# Patient Record
Sex: FEMALE | NOT HISPANIC OR LATINO | URBAN - METROPOLITAN AREA
[De-identification: names, ages, dates, MRNs, and addresses within clinical notes are randomized per-mention and may not be internally consistent; named-entity substitution may affect disease eponyms.]

---

## 2017-05-11 ENCOUNTER — IMPORTED ENCOUNTER (OUTPATIENT)
Dept: URBAN - METROPOLITAN AREA CLINIC 1 | Facility: CLINIC | Age: 72
End: 2017-05-11

## 2017-05-11 PROBLEM — H40.013: Noted: 2017-05-11

## 2017-05-11 PROBLEM — H25.813: Noted: 2017-05-11

## 2017-05-11 PROBLEM — H35.371: Noted: 2017-05-11

## 2017-05-11 PROBLEM — H43.392: Noted: 2017-05-11

## 2017-05-11 PROBLEM — H04.123: Noted: 2017-05-11

## 2017-05-11 PROBLEM — H16.143: Noted: 2017-05-11

## 2017-05-11 PROCEDURE — 92250 FUNDUS PHOTOGRAPHY W/I&R: CPT

## 2017-05-11 PROCEDURE — 92004 COMPRE OPH EXAM NEW PT 1/>: CPT

## 2017-05-11 PROCEDURE — 92015 DETERMINE REFRACTIVE STATE: CPT

## 2017-05-11 NOTE — PATIENT DISCUSSION
1.  KERRY w/ PEK OU- Recommend ATs QD-BID OU PRN 2. Cataract OU: Observe for now without intervention. The patient was advised to contact us if any change or worsening of vision3. Glaucoma Suspect OU (CD 0.60/0.65): IOP 17 OU. Negative family hx. Patient is considered Low Risk. Condition was discussed with patient and patient understands. Will continue to monitor patient for any progression in condition. Patient was advised to call us with any problems questions or concerns. Baseline disc photos done today. 4.  Epiretinal Membrane OD - Observe for change. 5. Floater OS6. S/p Blepharoplasty UL OU MRX for glasses givenReturn for an appointment in 1 month 10/OCT/24-2 VF with Dr. Jodie Barbour.

## 2017-06-07 ENCOUNTER — IMPORTED ENCOUNTER (OUTPATIENT)
Dept: URBAN - METROPOLITAN AREA CLINIC 1 | Facility: CLINIC | Age: 72
End: 2017-06-07

## 2017-06-07 PROBLEM — H25.813: Noted: 2017-06-07

## 2017-06-07 PROBLEM — H04.123: Noted: 2017-06-07

## 2017-06-07 PROBLEM — H40.013: Noted: 2017-06-07

## 2017-06-07 PROBLEM — H16.143: Noted: 2017-06-07

## 2017-06-07 PROCEDURE — 92083 EXTENDED VISUAL FIELD XM: CPT

## 2017-06-07 PROCEDURE — 92133 CPTRZD OPH DX IMG PST SGM ON: CPT

## 2017-06-07 PROCEDURE — 92012 INTRM OPH EXAM EST PATIENT: CPT

## 2017-06-07 NOTE — PATIENT DISCUSSION
1.  Glaucoma Suspect OU (CD 0.60/0.65): IOP stable. OCT and HVF WNL OU today. Negative family hx. Patient is considered Low Risk. Condition was discussed with patient and patient understands. Will continue to monitor patient for any progression in condition. Patient was advised to call us with any problems questions or concerns. 2.  KERRY w/ PEK OU- Recommend PF ATs BID OU routinely (sample of PF Systane Ultra given) 3. Cataract OU: Observe for now without intervention. The patient was advised to contact us if any change or worsening of vision4. H/o Epiretinal Membrane OD - Observe for change. 5. H/o Floater OS6. S/p Blepharoplasty UL OUReturn for an appointment in 1 year 30/OCT with Dr. Nanci Healy.

## 2018-06-06 ENCOUNTER — IMPORTED ENCOUNTER (OUTPATIENT)
Dept: URBAN - METROPOLITAN AREA CLINIC 1 | Facility: CLINIC | Age: 73
End: 2018-06-06

## 2018-06-06 PROBLEM — H43.812: Noted: 2018-06-06

## 2018-06-06 PROBLEM — H35.371: Noted: 2018-06-06

## 2018-06-06 PROBLEM — H25.813: Noted: 2018-06-06

## 2018-06-06 PROBLEM — H40.013: Noted: 2018-06-06

## 2018-06-06 PROBLEM — H16.143: Noted: 2018-06-06

## 2018-06-06 PROBLEM — H04.123: Noted: 2018-06-06

## 2018-06-06 PROCEDURE — 92014 COMPRE OPH EXAM EST PT 1/>: CPT

## 2018-06-06 PROCEDURE — 92133 CPTRZD OPH DX IMG PST SGM ON: CPT

## 2018-06-06 PROCEDURE — 92015 DETERMINE REFRACTIVE STATE: CPT

## 2018-06-06 NOTE — PATIENT DISCUSSION
1.  Glaucoma Suspect OU (0.65 OU):Stable IOP OU. Normal OCT OU. Patient is considered Low Risk. Condition was discussed with patient and patient understands. Will continue to monitor patient for any progression in condition. Patient was advised to call us with any problems questions or concerns. 2.  Cataract OU: Observe for now without intervention. The patient was advised to contact us if any change or worsening of vision3. KERRY w/ PEK OU- No improvement. The use of artificial tears OU BID were recommended. Sample of Refresh given. 4. Epiretinal Membrane OD- Appears benign. Stable. Observe for change. 5. PVD w/o Tear OS- Old stable. 6. Return for an appointment for a 27 in 1 year with Dr. Favio Rasmussen.

## 2019-06-03 ENCOUNTER — IMPORTED ENCOUNTER (OUTPATIENT)
Dept: URBAN - METROPOLITAN AREA CLINIC 1 | Facility: CLINIC | Age: 74
End: 2019-06-03

## 2019-06-03 PROBLEM — H43.812: Noted: 2019-06-03

## 2019-06-03 PROBLEM — H25.813: Noted: 2019-06-03

## 2019-06-03 PROBLEM — H04.123: Noted: 2019-06-03

## 2019-06-03 PROBLEM — H16.143: Noted: 2019-06-03

## 2019-06-03 PROBLEM — H40.013: Noted: 2019-06-03

## 2019-06-03 PROBLEM — H35.371: Noted: 2019-06-03

## 2019-06-03 PROCEDURE — 92014 COMPRE OPH EXAM EST PT 1/>: CPT

## 2019-06-03 PROCEDURE — 92015 DETERMINE REFRACTIVE STATE: CPT

## 2019-06-03 NOTE — PATIENT DISCUSSION
1.  Cataracts OU -- Observe for now without intervention. The patient was advised to contact us if any change or worsening of vision. 2. KERRY w/ PEK OU -- Recommend continue the frequent use of OTC AT's BID-QID OU Routinely. 3.  Epiretinal Membrane OD -- Stable appears benign. Continue to observe for changes / progression. 4. Glaucoma Suspect OU (CD: 0.70 / 0.75) -- IOP 17 OU today. Negative Family h/o Glaucoma. Past w/u Negative & WNL OU. Patient is considered Low Risk. Condition was discussed with patient and patient understands. Will continue to monitor patient for any progression in condition. Patient was advised to call us with any problems questions or concerns. 5.  PVD w/o Tear OS -- Old Stable. RD precautions given. 6.  S/p Blepharoplasty UL OU Finalized Glasses MRx was given to patient today. Return for an appointment in 1 YR for a 27 / OCT OU with Dr. Nayely Quiñonez.

## 2020-06-01 ENCOUNTER — IMPORTED ENCOUNTER (OUTPATIENT)
Dept: URBAN - METROPOLITAN AREA CLINIC 1 | Facility: CLINIC | Age: 75
End: 2020-06-01

## 2020-06-01 PROBLEM — H25.813: Noted: 2020-06-01

## 2020-06-01 PROBLEM — H35.371: Noted: 2020-06-01

## 2020-06-01 PROBLEM — H04.123: Noted: 2020-06-01

## 2020-06-01 PROBLEM — H16.143: Noted: 2020-06-01

## 2020-06-01 PROBLEM — H43.812: Noted: 2020-06-01

## 2020-06-01 PROBLEM — H40.013: Noted: 2020-06-01

## 2020-06-01 PROCEDURE — 92014 COMPRE OPH EXAM EST PT 1/>: CPT

## 2020-06-01 PROCEDURE — 92015 DETERMINE REFRACTIVE STATE: CPT

## 2020-06-01 PROCEDURE — 92133 CPTRZD OPH DX IMG PST SGM ON: CPT

## 2020-06-01 NOTE — PATIENT DISCUSSION
1.  Glaucoma Suspect OU : (0.7/0.75) OCT normal. Condition was discussed with patient and patient understands. Will continue to monitor patient for any progression in condition. Patient was advised to call us with any problems questions or concerns. 2.  Cataract OU: Observe for now without intervention. The patient was advised to contact us if any change or worsening of vision3. KERRY w/ PEK OU-The use/continuation of artificial tears were recommended. 4.  PVD w/o Tear OS- RD precautions. 5.  Epiretinal Membrane OD - Observe for change. 6. Return for an appointment in 1 year for 30 with Dr. Michelle Garza  (No testing)

## 2021-02-26 ENCOUNTER — IMPORTED ENCOUNTER (OUTPATIENT)
Dept: URBAN - METROPOLITAN AREA CLINIC 1 | Facility: CLINIC | Age: 76
End: 2021-02-26

## 2021-02-26 PROBLEM — H43.811: Noted: 2021-02-26

## 2021-02-26 PROCEDURE — 99213 OFFICE O/P EST LOW 20 MIN: CPT

## 2021-02-26 NOTE — PATIENT DISCUSSION
1.  PVD w/o Tear OU -- *New OD* RD Precautions. 2.  Cataract OU -- Observe. 3. KERRY w/ PEK OU -- Cont the use of ATs BID OU routinely. 4.  Glaucoma Suspect OU -- (C/D: 0.70/0.75) (-) Family Hx. Past w/i Negative. Patient is considered Low Risk. 5.  Epiretinal Membrane OD -- Observe for change. Return for an appointment as scheduled (30) with Dr. Bear Lockett.

## 2021-03-08 ENCOUNTER — IMPORTED ENCOUNTER (OUTPATIENT)
Dept: URBAN - METROPOLITAN AREA CLINIC 1 | Facility: CLINIC | Age: 76
End: 2021-03-08

## 2021-03-08 PROBLEM — H00.11: Noted: 2021-03-08

## 2021-03-08 PROBLEM — H00.12: Noted: 2021-03-08

## 2021-03-08 PROBLEM — H01.001: Noted: 2021-03-08

## 2021-03-08 PROCEDURE — 92012 INTRM OPH EXAM EST PATIENT: CPT

## 2021-03-08 NOTE — PATIENT DISCUSSION
1.  Chalazion right upper eyelid/right lower lid - Hot compresses TID x 5 minutes for 3 weeks. Begin Tobradex gtts QID OD. If without improvement discussed with patient possible Incision and Drainage procedure. Risks and benefits discussed with patient and patient states full understanding. 2. Anterior Blepharitis OD - Daily Hot compresses and lid scrubs were recommended. 3. Return for an appointment in 1 week for 10 with Dr. Gege Beauchamp.

## 2021-03-15 ENCOUNTER — IMPORTED ENCOUNTER (OUTPATIENT)
Dept: URBAN - METROPOLITAN AREA CLINIC 1 | Facility: CLINIC | Age: 76
End: 2021-03-15

## 2021-03-15 PROBLEM — H04.123: Noted: 2021-03-15

## 2021-03-15 PROBLEM — H25.13: Noted: 2021-03-15

## 2021-03-15 PROBLEM — H00.12: Noted: 2021-03-15

## 2021-03-15 PROBLEM — H00.11: Noted: 2021-03-15

## 2021-03-15 PROCEDURE — 92012 INTRM OPH EXAM EST PATIENT: CPT

## 2021-03-15 NOTE — PATIENT DISCUSSION
1.  Chalazion right upper and right lower eyelid - Hot compresses TID x 5 minutes for 3 weeks. If without improvement discussed with patient possible Incision and Drainage procedure. Risks and benefits discussed with patient and patient states full understanding. Cont Tobradex tid x 1 wk  then D/C2. Cataract OU: Observe for now without intervention. The patient was advised to contact us if any change or worsening of vision3. Dry Eyes OU -The use/continuation of artificial tears were recommended. Use OD after 1 wk. Cont OS4. Return for an appointment in 2 weeks for 10 with Dr. Jessica Hess.

## 2021-06-01 ENCOUNTER — IMPORTED ENCOUNTER (OUTPATIENT)
Dept: URBAN - METROPOLITAN AREA CLINIC 1 | Facility: CLINIC | Age: 76
End: 2021-06-01

## 2021-06-01 PROBLEM — H25.813: Noted: 2021-06-01

## 2021-06-01 PROBLEM — H16.143: Noted: 2021-06-01

## 2021-06-01 PROBLEM — H04.123: Noted: 2021-06-01

## 2021-06-01 PROBLEM — H43.813: Noted: 2021-06-01

## 2021-06-01 PROBLEM — H40.013: Noted: 2021-06-01

## 2021-06-01 PROBLEM — H35.371: Noted: 2021-06-01

## 2021-06-01 PROCEDURE — 99214 OFFICE O/P EST MOD 30 MIN: CPT

## 2021-06-01 NOTE — PATIENT DISCUSSION
1.  Glaucoma Suspect OU -(C/D: 0.70/0.75) (-) Family Hx. Past w/i Negative. Patient is considered Low Risk. Condition was discussed with patient and patient understands. Will continue to monitor patient for any progression in condition. Patient was advised to call us with any problems questions or concerns. 2.  Cataract OU - Observe for now without intervention. The patient was advised to contact us if any change or worsening of vision3. KERRY w/ PEK OU-The use/continuation of artificial tears were recommended. 4.  PVD w/o Tear OU - RD precautions. 5.  Epiretinal Membrane OD - Observe for change. Patient deferred Manifest Rx today. Return for an appointment in 1 year 30/OCT with Dr. eYimy Sanches.

## 2022-01-26 ENCOUNTER — IMPORTED ENCOUNTER (OUTPATIENT)
Dept: URBAN - METROPOLITAN AREA CLINIC 1 | Facility: CLINIC | Age: 77
End: 2022-01-26

## 2022-01-26 PROBLEM — H35.371: Noted: 2022-01-26

## 2022-01-26 PROBLEM — H35.373: Noted: 2022-01-26

## 2022-01-26 PROCEDURE — 92134 CPTRZ OPH DX IMG PST SGM RTA: CPT

## 2022-01-26 PROCEDURE — 92015 DETERMINE REFRACTIVE STATE: CPT

## 2022-01-26 PROCEDURE — 99213 OFFICE O/P EST LOW 20 MIN: CPT

## 2022-01-26 NOTE — PATIENT DISCUSSION
1.  Epiretinal Membrane OD w/ slight foveal involvment - Mac OCT shows ERM OD WNL OS. Observe for change. 2. Cataract OU - Observe for now without intervention. The patient was advised to contact us if any change or worsening of vision3. Glaucoma Suspect OU -(C/D: 0.70/0.75) (-) Family Hx. Past w/i Negative. Patient is considered Low Risk. Condition was discussed with patient and patient understands. Will continue to monitor patient for any progression in condition. Patient was advised to call us with any problems questions or concerns. 2.  Cataract OU - Observe for now without intervention. The patient was advised to contact us if any change or worsening of vision4. KERRY w/ PEK OU-The use/continuation of artificial tears were recommended. 5.  PVD w/o Tear OU - RD precautions. MRX for glasses given. (set expectations with OD due to ERM)Return for an appointment for Return as scheduled with Dr. Joni Schaumann.

## 2022-04-02 ASSESSMENT — VISUAL ACUITY
OS_SC: 20/20-2
OS_SC: 20/25
OD_GLARE: 20/50
OS_GLARE: 20/30
OS_SC: 20/20
OD_GLARE: 20/60
OS_CC: J1+
OD_CC: J1+
OD_CC: J1
OD_CC: 20/30-2
OD_SC: 20/25-2
OD_SC: 20/20
OD_CC: J1+
OD_SC: 20/20
OS_SC: 20/20
OS_CC: J1
OD_GLARE: 20/50
OS_GLARE: 20/60
OS_SC: 20/20
OS_GLARE: 20/60
OS_CC: J1+
OD_GLARE: 20/60
OS_GLARE: 20/50
OS_SC: 20/20
OD_SC: 20/25
OD_SC: 20/20
OS_CC: 20/30
OD_GLARE: 20/50
OS_GLARE: 20/50
OD_SC: 20/20
OD_SC: 20/20
OD_SC: 20/20-2
OS_SC: 20/20-2
OD_SC: 20/20

## 2022-04-02 ASSESSMENT — KERATOMETRY
OS_AXISANGLE2_DEGREES: 076
OS_K2POWER_DIOPTERS: 46.00
OS_AXISANGLE_DEGREES: 166
OS_K1POWER_DIOPTERS: 45.50
OD_AXISANGLE_DEGREES: 161
OD_K2POWER_DIOPTERS: 45.75
OD_K1POWER_DIOPTERS: 45.00
OD_AXISANGLE2_DEGREES: 071

## 2022-04-02 ASSESSMENT — TONOMETRY
OS_IOP_MMHG: 18
OD_IOP_MMHG: 16
OS_IOP_MMHG: 17
OS_IOP_MMHG: 17
OS_IOP_MMHG: 18
OS_IOP_MMHG: 15
OD_IOP_MMHG: 17
OD_IOP_MMHG: 17
OD_IOP_MMHG: 16
OD_IOP_MMHG: 16
OD_IOP_MMHG: 17
OD_IOP_MMHG: 15
OS_IOP_MMHG: 15
OS_IOP_MMHG: 17
OD_IOP_MMHG: 16
OS_IOP_MMHG: 16
OD_IOP_MMHG: 18
OD_IOP_MMHG: 15
OS_IOP_MMHG: 19

## 2022-06-10 ENCOUNTER — ESTABLISHED PATIENT (OUTPATIENT)
Dept: URBAN - METROPOLITAN AREA CLINIC 1 | Facility: CLINIC | Age: 77
End: 2022-06-10

## 2022-06-10 DIAGNOSIS — H43.813: ICD-10-CM

## 2022-06-10 DIAGNOSIS — H04.123: ICD-10-CM

## 2022-06-10 DIAGNOSIS — H25.813: ICD-10-CM

## 2022-06-10 DIAGNOSIS — H16.143: ICD-10-CM

## 2022-06-10 DIAGNOSIS — H35.371: ICD-10-CM

## 2022-06-10 DIAGNOSIS — H40.013: ICD-10-CM

## 2022-06-10 PROCEDURE — 99214 OFFICE O/P EST MOD 30 MIN: CPT

## 2022-06-10 PROCEDURE — 92133 CPTRZD OPH DX IMG PST SGM ON: CPT

## 2022-06-10 ASSESSMENT — VISUAL ACUITY
OD_CC: 20/20
OD_CC: J1+
OS_CC: J1+
OS_CC: 20/20-

## 2022-06-10 ASSESSMENT — TONOMETRY
OS_IOP_MMHG: 15
OD_IOP_MMHG: 15

## 2022-06-10 NOTE — PATIENT DISCUSSION
(C/D: 0.70/0.70) - IOP stable 15 OU. (-) Family Hx. OCT remains WNL OU. Condition was discussed with patient and patient understands. Will continue to monitor patient for any progression in condition. Patient was advised to call us with any problems questions or concerns.

## 2022-12-13 ENCOUNTER — FOLLOW UP (OUTPATIENT)
Dept: URBAN - METROPOLITAN AREA CLINIC 1 | Facility: CLINIC | Age: 77
End: 2022-12-13

## 2022-12-13 PROCEDURE — 92015 DETERMINE REFRACTIVE STATE: CPT

## 2022-12-13 PROCEDURE — 99213 OFFICE O/P EST LOW 20 MIN: CPT

## 2022-12-13 ASSESSMENT — VISUAL ACUITY
OD_CC: J1
OD_CC: 20/20
OS_CC: J1
OS_CC: 20/20

## 2022-12-13 ASSESSMENT — TONOMETRY
OD_IOP_MMHG: 18
OS_IOP_MMHG: 18

## 2022-12-13 NOTE — PATIENT DISCUSSION
(C/D: 0.70/0.70) - IOP stable at 18 OU today. (-) Family Hx.  Patient is considered low risk. Condition was discussed with patient and patient understands. Will continue to monitor patient for any progression in condition. Patient was advised to call us with any problems, questions, or concerns.

## 2023-06-16 ENCOUNTER — COMPREHENSIVE EXAM (OUTPATIENT)
Dept: URBAN - METROPOLITAN AREA CLINIC 1 | Facility: CLINIC | Age: 78
End: 2023-06-16

## 2023-06-16 DIAGNOSIS — H40.013: ICD-10-CM

## 2023-06-16 DIAGNOSIS — H16.143: ICD-10-CM

## 2023-06-16 DIAGNOSIS — H25.813: ICD-10-CM

## 2023-06-16 DIAGNOSIS — H35.371: ICD-10-CM

## 2023-06-16 DIAGNOSIS — H04.123: ICD-10-CM

## 2023-06-16 DIAGNOSIS — H43.813: ICD-10-CM

## 2023-06-16 PROCEDURE — 99214 OFFICE O/P EST MOD 30 MIN: CPT

## 2023-06-16 PROCEDURE — 92133 CPTRZD OPH DX IMG PST SGM ON: CPT

## 2023-06-16 ASSESSMENT — VISUAL ACUITY
OD_CC: 20/20
OS_CC: J1+
OS_CC: 20/20
OD_CC: J1+

## 2023-06-16 ASSESSMENT — TONOMETRY
OD_IOP_MMHG: 16
OS_IOP_MMHG: 17

## 2023-12-12 ENCOUNTER — FOLLOW UP (OUTPATIENT)
Dept: URBAN - METROPOLITAN AREA CLINIC 1 | Facility: CLINIC | Age: 78
End: 2023-12-12

## 2023-12-12 DIAGNOSIS — H25.813: ICD-10-CM

## 2023-12-12 DIAGNOSIS — H35.373: ICD-10-CM

## 2023-12-12 DIAGNOSIS — H35.3131: ICD-10-CM

## 2023-12-12 PROCEDURE — 92134 CPTRZ OPH DX IMG PST SGM RTA: CPT

## 2023-12-12 PROCEDURE — 99213 OFFICE O/P EST LOW 20 MIN: CPT

## 2023-12-12 ASSESSMENT — VISUAL ACUITY
OD_BAT: 20/30
OD_CC: J1+
OS_CC: J1+
OS_BAT: 20/40
OD_CC: 20/20
OS_CC: 20/25

## 2023-12-12 ASSESSMENT — TONOMETRY
OS_IOP_MMHG: 17
OD_IOP_MMHG: 17

## 2024-08-05 ENCOUNTER — COMPREHENSIVE EXAM (OUTPATIENT)
Dept: URBAN - METROPOLITAN AREA CLINIC 1 | Facility: CLINIC | Age: 79
End: 2024-08-05

## 2024-08-05 DIAGNOSIS — H35.373: ICD-10-CM

## 2024-08-05 DIAGNOSIS — H04.123: ICD-10-CM

## 2024-08-05 DIAGNOSIS — H16.143: ICD-10-CM

## 2024-08-05 DIAGNOSIS — H40.013: ICD-10-CM

## 2024-08-05 DIAGNOSIS — H35.3131: ICD-10-CM

## 2024-08-05 DIAGNOSIS — H25.813: ICD-10-CM

## 2024-08-05 DIAGNOSIS — H43.813: ICD-10-CM

## 2024-08-05 PROCEDURE — 92134 CPTRZ OPH DX IMG PST SGM RTA: CPT

## 2024-08-05 PROCEDURE — 99214 OFFICE O/P EST MOD 30 MIN: CPT

## 2024-08-05 PROCEDURE — 92015 DETERMINE REFRACTIVE STATE: CPT

## 2024-08-05 ASSESSMENT — TONOMETRY
OS_IOP_MMHG: 13
OD_IOP_MMHG: 12

## 2024-08-05 ASSESSMENT — VISUAL ACUITY
OD_BAT: 20/30
OD_CC: 20/20-1
OS_CC: J1+
OS_CC: 20/20
OS_BAT: 20/40
OD_CC: J1+

## 2025-02-10 ENCOUNTER — FOLLOW UP (OUTPATIENT)
Age: 80
End: 2025-02-10

## 2025-02-10 DIAGNOSIS — H40.013: ICD-10-CM

## 2025-02-10 PROCEDURE — 92133 CPTRZD OPH DX IMG PST SGM ON: CPT

## 2025-02-10 PROCEDURE — 99214 OFFICE O/P EST MOD 30 MIN: CPT

## 2025-08-12 ENCOUNTER — COMPREHENSIVE EXAM (OUTPATIENT)
Age: 80
End: 2025-08-12

## 2025-08-12 DIAGNOSIS — H16.143: ICD-10-CM

## 2025-08-12 DIAGNOSIS — H04.123: ICD-10-CM

## 2025-08-12 DIAGNOSIS — H25.813: ICD-10-CM

## 2025-08-12 DIAGNOSIS — H35.3131: ICD-10-CM

## 2025-08-12 DIAGNOSIS — H43.813: ICD-10-CM

## 2025-08-12 DIAGNOSIS — H40.013: ICD-10-CM

## 2025-08-12 DIAGNOSIS — H52.03: ICD-10-CM

## 2025-08-12 DIAGNOSIS — H35.373: ICD-10-CM

## 2025-08-12 PROCEDURE — 99214 OFFICE O/P EST MOD 30 MIN: CPT

## 2025-08-12 PROCEDURE — 92134 CPTRZ OPH DX IMG PST SGM RTA: CPT

## 2025-08-12 PROCEDURE — 92015 DETERMINE REFRACTIVE STATE: CPT
